# Patient Record
Sex: MALE | Race: WHITE | NOT HISPANIC OR LATINO | URBAN - METROPOLITAN AREA
[De-identification: names, ages, dates, MRNs, and addresses within clinical notes are randomized per-mention and may not be internally consistent; named-entity substitution may affect disease eponyms.]

---

## 2017-01-26 ENCOUNTER — APPOINTMENT (RX ONLY)
Dept: URBAN - METROPOLITAN AREA CLINIC 39 | Facility: CLINIC | Age: 77
Setting detail: DERMATOLOGY
End: 2017-01-26

## 2017-01-26 DIAGNOSIS — L82.1 OTHER SEBORRHEIC KERATOSIS: ICD-10-CM

## 2017-01-26 DIAGNOSIS — D18.0 HEMANGIOMA: ICD-10-CM

## 2017-01-26 DIAGNOSIS — Z85.828 PERSONAL HISTORY OF OTHER MALIGNANT NEOPLASM OF SKIN: ICD-10-CM

## 2017-01-26 DIAGNOSIS — L57.0 ACTINIC KERATOSIS: ICD-10-CM

## 2017-01-26 PROBLEM — D18.01 HEMANGIOMA OF SKIN AND SUBCUTANEOUS TISSUE: Status: ACTIVE | Noted: 2017-01-26

## 2017-01-26 PROBLEM — D48.5 NEOPLASM OF UNCERTAIN BEHAVIOR OF SKIN: Status: ACTIVE | Noted: 2017-01-26

## 2017-01-26 PROCEDURE — 17000 DESTRUCT PREMALG LESION: CPT

## 2017-01-26 PROCEDURE — 99213 OFFICE O/P EST LOW 20 MIN: CPT | Mod: 25

## 2017-01-26 PROCEDURE — ? COUNSELING

## 2017-01-26 PROCEDURE — 17003 DESTRUCT PREMALG LES 2-14: CPT

## 2017-01-26 PROCEDURE — 69100 BIOPSY OF EXTERNAL EAR: CPT | Mod: 59

## 2017-01-26 PROCEDURE — ? BIOPSY BY SHAVE METHOD

## 2017-01-26 PROCEDURE — ? LIQUID NITROGEN

## 2017-01-26 ASSESSMENT — LOCATION SIMPLE DESCRIPTION DERM
LOCATION SIMPLE: RIGHT CHEEK
LOCATION SIMPLE: SCALP
LOCATION SIMPLE: LEFT HAND
LOCATION SIMPLE: RIGHT HAND
LOCATION SIMPLE: UPPER BACK
LOCATION SIMPLE: LEFT EAR
LOCATION SIMPLE: ABDOMEN

## 2017-01-26 ASSESSMENT — LOCATION DETAILED DESCRIPTION DERM
LOCATION DETAILED: RIGHT SUPERIOR LATERAL MALAR CHEEK
LOCATION DETAILED: LEFT ANTITRAGUS
LOCATION DETAILED: XIPHOID
LOCATION DETAILED: RIGHT INFERIOR CENTRAL MALAR CHEEK
LOCATION DETAILED: LEFT ULNAR DORSAL HAND
LOCATION DETAILED: INFERIOR THORACIC SPINE
LOCATION DETAILED: LEFT SUPERIOR PARIETAL SCALP
LOCATION DETAILED: RIGHT RADIAL DORSAL HAND

## 2017-01-26 ASSESSMENT — LOCATION ZONE DERM
LOCATION ZONE: TRUNK
LOCATION ZONE: FACE
LOCATION ZONE: EAR
LOCATION ZONE: HAND
LOCATION ZONE: SCALP

## 2017-01-26 NOTE — PROCEDURE: BIOPSY BY SHAVE METHOD
Body Location Override (Optional - Billing Will Still Be Based On Selected Body Map Location If Applicable): right ear

## 2017-01-26 NOTE — PROCEDURE: MIPS QUALITY
Quality 130: Documentation Of Current Medications In The Medical Record: Current Medications Documented
Quality 226: Preventive Care And Screening: Tobacco Use: Screening And Cessation Intervention: Patient screened for tobacco and never smoked
Detail Level: Detailed
Quality 111:Pneumonia Vaccination Status For Older Adults: Pneumococcal Vaccination Previously Received
Quality 110: Preventive Care And Screening: Influenza Immunization: Influenza Immunization Administered during Influenza season

## 2017-01-26 NOTE — PROCEDURE: LIQUID NITROGEN
Render Post-Care Instructions In Note?: no
Post-Care Instructions: I reviewed with the patient in detail post-care instructions. Patient is to wear sunprotection, and avoid picking at any of the treated lesions. Pt may apply Vaseline to crusted or scabbing areas.
Detail Level: Simple
Duration Of Freeze Thaw-Cycle (Seconds): 0
Total Number Of Aks Treated: 13
Consent: The patient's consent was obtained including but not limited to risks of crusting, scabbing, blistering, scarring, darker or lighter pigmentary change, recurrence, incomplete removal and infection.

## 2017-07-27 ENCOUNTER — APPOINTMENT (RX ONLY)
Dept: URBAN - METROPOLITAN AREA CLINIC 39 | Facility: CLINIC | Age: 77
Setting detail: DERMATOLOGY
End: 2017-07-27

## 2017-07-27 DIAGNOSIS — L82.1 OTHER SEBORRHEIC KERATOSIS: ICD-10-CM

## 2017-07-27 DIAGNOSIS — D18.0 HEMANGIOMA: ICD-10-CM

## 2017-07-27 DIAGNOSIS — Z85.828 PERSONAL HISTORY OF OTHER MALIGNANT NEOPLASM OF SKIN: ICD-10-CM

## 2017-07-27 DIAGNOSIS — L57.0 ACTINIC KERATOSIS: ICD-10-CM

## 2017-07-27 PROBLEM — D18.01 HEMANGIOMA OF SKIN AND SUBCUTANEOUS TISSUE: Status: ACTIVE | Noted: 2017-07-27

## 2017-07-27 PROCEDURE — 17003 DESTRUCT PREMALG LES 2-14: CPT

## 2017-07-27 PROCEDURE — ? COUNSELING

## 2017-07-27 PROCEDURE — 99213 OFFICE O/P EST LOW 20 MIN: CPT | Mod: 25

## 2017-07-27 PROCEDURE — ? LIQUID NITROGEN

## 2017-07-27 PROCEDURE — 17000 DESTRUCT PREMALG LESION: CPT

## 2017-07-27 ASSESSMENT — LOCATION SIMPLE DESCRIPTION DERM
LOCATION SIMPLE: LEFT CHEEK
LOCATION SIMPLE: UPPER BACK
LOCATION SIMPLE: RIGHT EAR
LOCATION SIMPLE: LEFT EAR
LOCATION SIMPLE: RIGHT CHEEK
LOCATION SIMPLE: RIGHT FOREHEAD
LOCATION SIMPLE: ABDOMEN

## 2017-07-27 ASSESSMENT — LOCATION DETAILED DESCRIPTION DERM
LOCATION DETAILED: LEFT ANTITRAGUS
LOCATION DETAILED: XIPHOID
LOCATION DETAILED: INFERIOR THORACIC SPINE
LOCATION DETAILED: LEFT SUPERIOR LATERAL MALAR CHEEK
LOCATION DETAILED: RIGHT SUPERIOR HELIX
LOCATION DETAILED: LEFT ANTIHELIX
LOCATION DETAILED: RIGHT INFERIOR CENTRAL MALAR CHEEK
LOCATION DETAILED: RIGHT CYMBA CONCHA
LOCATION DETAILED: RIGHT FOREHEAD
LOCATION DETAILED: RIGHT INFERIOR FOREHEAD

## 2017-07-27 ASSESSMENT — LOCATION ZONE DERM
LOCATION ZONE: FACE
LOCATION ZONE: EAR
LOCATION ZONE: TRUNK

## 2017-07-27 NOTE — PROCEDURE: MIPS QUALITY
Detail Level: Detailed
Quality 226: Preventive Care And Screening: Tobacco Use: Screening And Cessation Intervention: Patient screened for tobacco and never smoked
Quality 130: Documentation Of Current Medications In The Medical Record: Current Medications Documented
Quality 111:Pneumonia Vaccination Status For Older Adults: Pneumococcal Vaccination Previously Received
Quality 110: Preventive Care And Screening: Influenza Immunization: Influenza Immunization Administered during Influenza season

## 2017-07-27 NOTE — PROCEDURE: LIQUID NITROGEN
Post-Care Instructions: I reviewed with the patient in detail post-care instructions. Patient is to wear sunprotection, and avoid picking at any of the treated lesions. Pt may apply Vaseline to crusted or scabbing areas.
Render Post-Care Instructions In Note?: no
Consent: The patient's consent was obtained including but not limited to risks of crusting, scabbing, blistering, scarring, darker or lighter pigmentary change, recurrence, incomplete removal and infection.
Detail Level: Simple
Duration Of Freeze Thaw-Cycle (Seconds): 0
Total Number Of Aks Treated: 7

## 2018-08-02 ENCOUNTER — APPOINTMENT (RX ONLY)
Dept: URBAN - METROPOLITAN AREA CLINIC 39 | Facility: CLINIC | Age: 78
Setting detail: DERMATOLOGY
End: 2018-08-02

## 2018-08-02 DIAGNOSIS — L82.1 OTHER SEBORRHEIC KERATOSIS: ICD-10-CM

## 2018-08-02 DIAGNOSIS — Z85.828 PERSONAL HISTORY OF OTHER MALIGNANT NEOPLASM OF SKIN: ICD-10-CM

## 2018-08-02 DIAGNOSIS — L57.0 ACTINIC KERATOSIS: ICD-10-CM

## 2018-08-02 DIAGNOSIS — D18.0 HEMANGIOMA: ICD-10-CM

## 2018-08-02 PROBLEM — I48.91 UNSPECIFIED ATRIAL FIBRILLATION: Status: ACTIVE | Noted: 2018-08-02

## 2018-08-02 PROBLEM — D18.01 HEMANGIOMA OF SKIN AND SUBCUTANEOUS TISSUE: Status: ACTIVE | Noted: 2018-08-02

## 2018-08-02 PROCEDURE — 99213 OFFICE O/P EST LOW 20 MIN: CPT | Mod: 25

## 2018-08-02 PROCEDURE — 17000 DESTRUCT PREMALG LESION: CPT

## 2018-08-02 PROCEDURE — ? LIQUID NITROGEN

## 2018-08-02 PROCEDURE — 17003 DESTRUCT PREMALG LES 2-14: CPT

## 2018-08-02 PROCEDURE — ? COUNSELING

## 2018-08-02 ASSESSMENT — LOCATION ZONE DERM
LOCATION ZONE: EAR
LOCATION ZONE: FACE
LOCATION ZONE: TRUNK
LOCATION ZONE: SCALP
LOCATION ZONE: ARM

## 2018-08-02 ASSESSMENT — LOCATION SIMPLE DESCRIPTION DERM
LOCATION SIMPLE: RIGHT EAR
LOCATION SIMPLE: UPPER BACK
LOCATION SIMPLE: SCALP
LOCATION SIMPLE: ABDOMEN
LOCATION SIMPLE: LEFT FOREARM
LOCATION SIMPLE: LEFT CHEEK
LOCATION SIMPLE: LEFT EAR
LOCATION SIMPLE: RIGHT CHEEK

## 2018-08-02 ASSESSMENT — LOCATION DETAILED DESCRIPTION DERM
LOCATION DETAILED: INFERIOR THORACIC SPINE
LOCATION DETAILED: LEFT SUPERIOR LATERAL MALAR CHEEK
LOCATION DETAILED: RIGHT CYMBA CONCHA
LOCATION DETAILED: RIGHT CENTRAL MALAR CHEEK
LOCATION DETAILED: LEFT SUPERIOR PARIETAL SCALP
LOCATION DETAILED: LEFT ANTITRAGUS
LOCATION DETAILED: LEFT DISTAL DORSAL FOREARM
LOCATION DETAILED: XIPHOID
LOCATION DETAILED: RIGHT ANTIHELIX

## 2018-08-02 NOTE — PROCEDURE: MIPS QUALITY
Quality 431: Preventive Care And Screening: Unhealthy Alcohol Use - Screening: Patient screened for unhealthy alcohol use using a single question and scores less than 2 times per year
Quality 130: Documentation Of Current Medications In The Medical Record: Current Medications Documented
Quality 226: Preventive Care And Screening: Tobacco Use: Screening And Cessation Intervention: Patient screened for tobacco and is an ex-smoker
Quality 47: Advance Care Plan: Advance Care Planning discussed and documented; advance care plan or surrogate decision maker documented in the medical record.
Quality 111:Pneumonia Vaccination Status For Older Adults: Pneumococcal Vaccination Previously Received
Quality 131: Pain Assessment And Follow-Up: Pain assessment using a standardized tool is documented as negative, no follow-up plan required
Quality 110: Preventive Care And Screening: Influenza Immunization: Influenza Immunization Administered during Influenza season
Detail Level: Detailed

## 2018-08-02 NOTE — PROCEDURE: LIQUID NITROGEN
Total Number Of Aks Treated: 6
Render Post-Care Instructions In Note?: no
Post-Care Instructions: I reviewed with the patient in detail post-care instructions. Patient is to wear sunprotection, and avoid picking at any of the treated lesions. Pt may apply Vaseline to crusted or scabbing areas.
Detail Level: Simple
Consent: The patient's consent was obtained including but not limited to risks of crusting, scabbing, blistering, scarring, darker or lighter pigmentary change, recurrence, incomplete removal and infection.
Duration Of Freeze Thaw-Cycle (Seconds): 0

## 2018-08-20 ENCOUNTER — APPOINTMENT (RX ONLY)
Dept: URBAN - METROPOLITAN AREA CLINIC 39 | Facility: CLINIC | Age: 78
Setting detail: DERMATOLOGY
End: 2018-08-20

## 2018-08-20 DIAGNOSIS — L82.1 OTHER SEBORRHEIC KERATOSIS: ICD-10-CM

## 2018-08-20 PROCEDURE — 99213 OFFICE O/P EST LOW 20 MIN: CPT

## 2018-08-20 PROCEDURE — ? COUNSELING

## 2018-08-20 ASSESSMENT — LOCATION DETAILED DESCRIPTION DERM: LOCATION DETAILED: RIGHT INFERIOR UPPER BACK

## 2018-08-20 ASSESSMENT — LOCATION ZONE DERM: LOCATION ZONE: TRUNK

## 2018-08-20 ASSESSMENT — LOCATION SIMPLE DESCRIPTION DERM: LOCATION SIMPLE: RIGHT UPPER BACK

## 2018-08-20 NOTE — PROCEDURE: MIPS QUALITY
Quality 47: Advance Care Plan: Advance Care Planning discussed and documented; advance care plan or surrogate decision maker documented in the medical record.
Detail Level: Detailed
Quality 431: Preventive Care And Screening: Unhealthy Alcohol Use - Screening: Patient screened for unhealthy alcohol use using a single question and scores less than 2 times per year
Quality 131: Pain Assessment And Follow-Up: Pain assessment using a standardized tool is documented as negative, no follow-up plan required
Quality 110: Preventive Care And Screening: Influenza Immunization: Influenza Immunization Administered during Influenza season
Quality 111:Pneumonia Vaccination Status For Older Adults: Pneumococcal Vaccination Previously Received
Quality 130: Documentation Of Current Medications In The Medical Record: Current Medications Documented
Quality 226: Preventive Care And Screening: Tobacco Use: Screening And Cessation Intervention: Patient screened for tobacco and is an ex-smoker

## 2019-10-02 ENCOUNTER — APPOINTMENT (RX ONLY)
Dept: URBAN - METROPOLITAN AREA CLINIC 39 | Facility: CLINIC | Age: 79
Setting detail: DERMATOLOGY
End: 2019-10-02

## 2019-10-02 DIAGNOSIS — L57.0 ACTINIC KERATOSIS: ICD-10-CM

## 2019-10-02 PROCEDURE — ? LIQUID NITROGEN

## 2019-10-02 PROCEDURE — ? COUNSELING

## 2019-10-02 PROCEDURE — 17003 DESTRUCT PREMALG LES 2-14: CPT

## 2019-10-02 PROCEDURE — 17000 DESTRUCT PREMALG LESION: CPT

## 2019-10-02 ASSESSMENT — LOCATION SIMPLE DESCRIPTION DERM
LOCATION SIMPLE: RIGHT EAR
LOCATION SIMPLE: LEFT EAR
LOCATION SIMPLE: RIGHT CHEEK

## 2019-10-02 ASSESSMENT — LOCATION DETAILED DESCRIPTION DERM
LOCATION DETAILED: RIGHT INFERIOR MEDIAL MALAR CHEEK
LOCATION DETAILED: RIGHT ANTIHELIX
LOCATION DETAILED: LEFT SUPERIOR HELIX

## 2019-10-02 ASSESSMENT — LOCATION ZONE DERM
LOCATION ZONE: FACE
LOCATION ZONE: EAR

## 2019-10-02 NOTE — PROCEDURE: LIQUID NITROGEN
Post-Care Instructions: I reviewed with the patient in detail post-care instructions. Patient is to wear sunprotection, and avoid picking at any of the treated lesions. Pt may apply Vaseline to crusted or scabbing areas.
Render Note In Bullet Format When Appropriate: No
Detail Level: Detailed
Duration Of Freeze Thaw-Cycle (Seconds): 3
Consent: The patient's consent was obtained.  Risks associated with liquid nitrogen including but not limited to crusting, scabbing, blistering, scarring, darker or lighter pigmentary change, recurrence, incomplete removal and infection.

## 2020-01-08 ENCOUNTER — APPOINTMENT (RX ONLY)
Dept: URBAN - METROPOLITAN AREA CLINIC 39 | Facility: CLINIC | Age: 80
Setting detail: DERMATOLOGY
End: 2020-01-08

## 2020-01-08 DIAGNOSIS — L57.0 ACTINIC KERATOSIS: ICD-10-CM

## 2020-01-08 DIAGNOSIS — Z85.828 PERSONAL HISTORY OF OTHER MALIGNANT NEOPLASM OF SKIN: ICD-10-CM

## 2020-01-08 PROCEDURE — 17000 DESTRUCT PREMALG LESION: CPT

## 2020-01-08 PROCEDURE — ? LIQUID NITROGEN

## 2020-01-08 PROCEDURE — 99213 OFFICE O/P EST LOW 20 MIN: CPT | Mod: 25

## 2020-01-08 PROCEDURE — ? COUNSELING

## 2020-01-08 PROCEDURE — 17003 DESTRUCT PREMALG LES 2-14: CPT

## 2020-01-08 ASSESSMENT — LOCATION ZONE DERM
LOCATION ZONE: HAND
LOCATION ZONE: FACE
LOCATION ZONE: EAR

## 2020-01-08 ASSESSMENT — LOCATION DETAILED DESCRIPTION DERM
LOCATION DETAILED: LEFT INFERIOR CENTRAL MALAR CHEEK
LOCATION DETAILED: LEFT ANTIHELIX
LOCATION DETAILED: LEFT ANTITRAGUS
LOCATION DETAILED: LEFT ULNAR DORSAL HAND
LOCATION DETAILED: LEFT SUPERIOR LATERAL MALAR CHEEK
LOCATION DETAILED: RIGHT CYMBA CONCHA

## 2020-01-08 ASSESSMENT — LOCATION SIMPLE DESCRIPTION DERM
LOCATION SIMPLE: LEFT CHEEK
LOCATION SIMPLE: RIGHT EAR
LOCATION SIMPLE: LEFT EAR
LOCATION SIMPLE: LEFT HAND

## 2020-01-08 NOTE — PROCEDURE: LIQUID NITROGEN
Render In Bullet Format When Appropriate: No
Duration Of Freeze Thaw-Cycle (Seconds): 0
Total Number Of Aks Treated: 5
Post-Care Instructions: I reviewed with the patient in detail post-care instructions. Patient is to wear sunprotection, and avoid picking at any of the treated lesions. Pt may apply Vaseline to crusted or scabbing areas.
Detail Level: Zone
Consent: The patient's consent was obtained including but not limited to risks of crusting, scabbing, blistering, scarring, darker or lighter pigmentary change, recurrence, incomplete removal and infection.

## 2020-07-08 ENCOUNTER — APPOINTMENT (RX ONLY)
Dept: URBAN - METROPOLITAN AREA CLINIC 39 | Facility: CLINIC | Age: 80
Setting detail: DERMATOLOGY
End: 2020-07-08

## 2020-07-08 DIAGNOSIS — Z85.828 PERSONAL HISTORY OF OTHER MALIGNANT NEOPLASM OF SKIN: ICD-10-CM

## 2020-07-08 DIAGNOSIS — L57.0 ACTINIC KERATOSIS: ICD-10-CM

## 2020-07-08 DIAGNOSIS — L72.0 EPIDERMAL CYST: ICD-10-CM

## 2020-07-08 DIAGNOSIS — L82.1 OTHER SEBORRHEIC KERATOSIS: ICD-10-CM

## 2020-07-08 PROCEDURE — 17003 DESTRUCT PREMALG LES 2-14: CPT

## 2020-07-08 PROCEDURE — ? COUNSELING

## 2020-07-08 PROCEDURE — 99213 OFFICE O/P EST LOW 20 MIN: CPT | Mod: 25

## 2020-07-08 PROCEDURE — 17000 DESTRUCT PREMALG LESION: CPT

## 2020-07-08 PROCEDURE — ? ADDITIONAL NOTES

## 2020-07-08 PROCEDURE — ? LIQUID NITROGEN

## 2020-07-08 ASSESSMENT — LOCATION DETAILED DESCRIPTION DERM
LOCATION DETAILED: LEFT SUPERIOR CENTRAL MALAR CHEEK
LOCATION DETAILED: RIGHT INFERIOR CRUS OF ANTIHELIX
LOCATION DETAILED: LEFT SUPERIOR CRUS OF ANTIHELIX
LOCATION DETAILED: RIGHT SUPERIOR HELIX
LOCATION DETAILED: LEFT INFERIOR CRUS OF ANTIHELIX
LOCATION DETAILED: RIGHT MID-UPPER BACK
LOCATION DETAILED: RIGHT SUPERIOR MEDIAL UPPER BACK
LOCATION DETAILED: MID POSTERIOR NECK

## 2020-07-08 ASSESSMENT — LOCATION SIMPLE DESCRIPTION DERM
LOCATION SIMPLE: LEFT CHEEK
LOCATION SIMPLE: LEFT EAR
LOCATION SIMPLE: POSTERIOR NECK
LOCATION SIMPLE: RIGHT EAR
LOCATION SIMPLE: RIGHT UPPER BACK

## 2020-07-08 ASSESSMENT — LOCATION ZONE DERM
LOCATION ZONE: FACE
LOCATION ZONE: TRUNK
LOCATION ZONE: EAR
LOCATION ZONE: NECK

## 2020-07-08 NOTE — PROCEDURE: MIPS QUALITY
Quality 47: Advance Care Plan: Advance Care Planning discussed and documented in the medical record; patient did not wish or was not able to name a surrogate decision maker or provide an advance care plan.
Quality 226: Preventive Care And Screening: Tobacco Use: Screening And Cessation Intervention: Patient screened for tobacco use and is an ex/non-smoker
Detail Level: Detailed
Quality 111:Pneumonia Vaccination Status For Older Adults: Pneumococcal Vaccination Previously Received
Quality 130: Documentation Of Current Medications In The Medical Record: Current Medications Documented
Quality 402: Tobacco Use And Help With Quitting Among Adolescents: Patient screened for tobacco and is an ex-smoker
Quality 431: Preventive Care And Screening: Unhealthy Alcohol Use - Screening: Patient screened for unhealthy alcohol use using a single question and scores less than 2 times per year
Quality 110: Preventive Care And Screening: Influenza Immunization: Influenza Immunization not Administered because Patient Refused.

## 2020-07-08 NOTE — PROCEDURE: LIQUID NITROGEN
Consent: The patient's consent was obtained including but not limited to risks of crusting, scabbing, blistering, scarring, darker or lighter pigmentary change, recurrence, incomplete removal and infection.
Render Post-Care Instructions In Note?: no
Total Number Of Aks Treated: 5
Post-Care Instructions: I reviewed with the patient in detail post-care instructions. Patient is to wear sunprotection, and avoid picking at any of the treated lesions. Pt may apply Vaseline to crusted or scabbing areas.
Duration Of Freeze Thaw-Cycle (Seconds): 0
Detail Level: Zone

## 2021-01-06 ENCOUNTER — APPOINTMENT (RX ONLY)
Dept: URBAN - METROPOLITAN AREA CLINIC 39 | Facility: CLINIC | Age: 81
Setting detail: DERMATOLOGY
End: 2021-01-06

## 2021-01-06 DIAGNOSIS — L82.1 OTHER SEBORRHEIC KERATOSIS: ICD-10-CM | Status: STABLE

## 2021-01-06 DIAGNOSIS — L81.4 OTHER MELANIN HYPERPIGMENTATION: ICD-10-CM

## 2021-01-06 DIAGNOSIS — Z85.828 PERSONAL HISTORY OF OTHER MALIGNANT NEOPLASM OF SKIN: ICD-10-CM

## 2021-01-06 DIAGNOSIS — L57.0 ACTINIC KERATOSIS: ICD-10-CM

## 2021-01-06 PROCEDURE — ? ADDITIONAL NOTES

## 2021-01-06 PROCEDURE — ? LIQUID NITROGEN

## 2021-01-06 PROCEDURE — ? COUNSELING

## 2021-01-06 PROCEDURE — 17000 DESTRUCT PREMALG LESION: CPT

## 2021-01-06 PROCEDURE — 17003 DESTRUCT PREMALG LES 2-14: CPT

## 2021-01-06 PROCEDURE — 99213 OFFICE O/P EST LOW 20 MIN: CPT | Mod: 25

## 2021-01-06 ASSESSMENT — LOCATION ZONE DERM
LOCATION ZONE: EAR
LOCATION ZONE: TRUNK
LOCATION ZONE: FACE

## 2021-01-06 ASSESSMENT — LOCATION SIMPLE DESCRIPTION DERM
LOCATION SIMPLE: RIGHT CHEEK
LOCATION SIMPLE: LEFT CHEEK
LOCATION SIMPLE: LEFT FOREHEAD
LOCATION SIMPLE: RIGHT EAR
LOCATION SIMPLE: LEFT UPPER BACK
LOCATION SIMPLE: LEFT EAR

## 2021-01-06 ASSESSMENT — LOCATION DETAILED DESCRIPTION DERM
LOCATION DETAILED: RIGHT SUPERIOR HELIX
LOCATION DETAILED: LEFT MID-UPPER BACK
LOCATION DETAILED: RIGHT SUPERIOR LATERAL MALAR CHEEK
LOCATION DETAILED: LEFT SUPERIOR CENTRAL MALAR CHEEK
LOCATION DETAILED: LEFT SUPERIOR LATERAL MALAR CHEEK
LOCATION DETAILED: LEFT MEDIAL FOREHEAD
LOCATION DETAILED: LEFT SUPERIOR CRUS OF ANTIHELIX
LOCATION DETAILED: LEFT CENTRAL MALAR CHEEK

## 2021-01-06 NOTE — PROCEDURE: LIQUID NITROGEN
Render In Bullet Format When Appropriate: No
Post-Care Instructions: I reviewed with the patient in detail post-care instructions. Patient is to wear sunprotection, and avoid picking at any of the treated lesions. Pt may apply Vaseline to crusted or scabbing areas.
Total Number Of Aks Treated: 6
Detail Level: Zone
Duration Of Freeze Thaw-Cycle (Seconds): 0
Consent: The patient's consent was obtained including but not limited to risks of crusting, scabbing, blistering, scarring, darker or lighter pigmentary change, recurrence, incomplete removal and infection.

## 2021-02-08 ENCOUNTER — APPOINTMENT (RX ONLY)
Dept: URBAN - METROPOLITAN AREA CLINIC 39 | Facility: CLINIC | Age: 81
Setting detail: DERMATOLOGY
End: 2021-02-08

## 2021-02-08 DIAGNOSIS — L57.0 ACTINIC KERATOSIS: ICD-10-CM

## 2021-02-08 PROCEDURE — ? LIQUID NITROGEN

## 2021-02-08 PROCEDURE — 17003 DESTRUCT PREMALG LES 2-14: CPT

## 2021-02-08 PROCEDURE — 17000 DESTRUCT PREMALG LESION: CPT

## 2021-02-08 PROCEDURE — ? ADDITIONAL NOTES

## 2021-02-08 ASSESSMENT — LOCATION DETAILED DESCRIPTION DERM
LOCATION DETAILED: LEFT CENTRAL FRONTAL SCALP
LOCATION DETAILED: RIGHT MEDIAL FRONTAL SCALP
LOCATION DETAILED: LEFT SUPERIOR OCCIPITAL SCALP
LOCATION DETAILED: LEFT SUPERIOR POSTERIOR NECK

## 2021-02-08 ASSESSMENT — LOCATION SIMPLE DESCRIPTION DERM
LOCATION SIMPLE: POSTERIOR NECK
LOCATION SIMPLE: RIGHT SCALP
LOCATION SIMPLE: LEFT OCCIPITAL SCALP
LOCATION SIMPLE: LEFT SCALP

## 2021-02-08 ASSESSMENT — LOCATION ZONE DERM
LOCATION ZONE: NECK
LOCATION ZONE: SCALP

## 2021-02-08 NOTE — PROCEDURE: LIQUID NITROGEN
Render In Bullet Format When Appropriate: No
Total Number Of Aks Treated: 4
Detail Level: Zone
Consent: The patient's consent was obtained including but not limited to risks of crusting, scabbing, blistering, scarring, darker or lighter pigmentary change, recurrence, incomplete removal and infection.
Duration Of Freeze Thaw-Cycle (Seconds): 0
Post-Care Instructions: I reviewed with the patient in detail post-care instructions. Patient is to wear sunprotection, and avoid picking at any of the treated lesions. Pt may apply Vaseline to crusted or scabbing areas.

## 2021-06-02 ENCOUNTER — APPOINTMENT (RX ONLY)
Dept: URBAN - METROPOLITAN AREA CLINIC 39 | Facility: CLINIC | Age: 81
Setting detail: DERMATOLOGY
End: 2021-06-02

## 2021-06-02 DIAGNOSIS — L82.0 INFLAMED SEBORRHEIC KERATOSIS: ICD-10-CM

## 2021-06-02 DIAGNOSIS — L57.0 ACTINIC KERATOSIS: ICD-10-CM

## 2021-06-02 DIAGNOSIS — Z85.828 PERSONAL HISTORY OF OTHER MALIGNANT NEOPLASM OF SKIN: ICD-10-CM

## 2021-06-02 DIAGNOSIS — L82.1 OTHER SEBORRHEIC KERATOSIS: ICD-10-CM

## 2021-06-02 DIAGNOSIS — L81.4 OTHER MELANIN HYPERPIGMENTATION: ICD-10-CM

## 2021-06-02 PROCEDURE — 17110 DESTRUCTION B9 LES UP TO 14: CPT

## 2021-06-02 PROCEDURE — ? LIQUID NITROGEN

## 2021-06-02 PROCEDURE — ? BENIGN DESTRUCTION

## 2021-06-02 PROCEDURE — ? FULL BODY SKIN EXAM

## 2021-06-02 PROCEDURE — ? ADDITIONAL NOTES

## 2021-06-02 PROCEDURE — 99213 OFFICE O/P EST LOW 20 MIN: CPT | Mod: 25

## 2021-06-02 PROCEDURE — ? COUNSELING

## 2021-06-02 PROCEDURE — 17003 DESTRUCT PREMALG LES 2-14: CPT | Mod: 59

## 2021-06-02 PROCEDURE — 17000 DESTRUCT PREMALG LESION: CPT | Mod: 59

## 2021-06-02 PROCEDURE — ? SUNSCREEN RECOMMENDATIONS

## 2021-06-02 ASSESSMENT — LOCATION DETAILED DESCRIPTION DERM
LOCATION DETAILED: LEFT SUPERIOR CRUS OF ANTIHELIX
LOCATION DETAILED: LEFT MEDIAL MALAR CHEEK
LOCATION DETAILED: INFERIOR THORACIC SPINE
LOCATION DETAILED: RIGHT INFERIOR LATERAL FOREHEAD
LOCATION DETAILED: MEDIAL FRONTAL SCALP
LOCATION DETAILED: RIGHT ANTIHELIX
LOCATION DETAILED: RIGHT SUPERIOR MEDIAL UPPER BACK
LOCATION DETAILED: LEFT MID TEMPLE
LOCATION DETAILED: RIGHT SUPERIOR HELIX
LOCATION DETAILED: LEFT SUPERIOR CENTRAL MALAR CHEEK
LOCATION DETAILED: LEFT LATERAL FOREHEAD
LOCATION DETAILED: RIGHT INFERIOR TEMPLE

## 2021-06-02 ASSESSMENT — LOCATION SIMPLE DESCRIPTION DERM
LOCATION SIMPLE: LEFT EAR
LOCATION SIMPLE: LEFT CHEEK
LOCATION SIMPLE: RIGHT EAR
LOCATION SIMPLE: FRONTAL SCALP
LOCATION SIMPLE: UPPER BACK
LOCATION SIMPLE: RIGHT TEMPLE
LOCATION SIMPLE: LEFT TEMPLE
LOCATION SIMPLE: RIGHT UPPER BACK
LOCATION SIMPLE: LEFT FOREHEAD
LOCATION SIMPLE: RIGHT FOREHEAD

## 2021-06-02 ASSESSMENT — LOCATION ZONE DERM
LOCATION ZONE: FACE
LOCATION ZONE: SCALP
LOCATION ZONE: TRUNK
LOCATION ZONE: EAR

## 2021-06-02 NOTE — PROCEDURE: LIQUID NITROGEN
Detail Level: Zone
Total Number Of Aks Treated: 11
Duration Of Freeze Thaw-Cycle (Seconds): 0
Render In Bullet Format When Appropriate: No
Consent: The patient's consent was obtained including but not limited to risks of crusting, scabbing, blistering, scarring, darker or lighter pigmentary change, recurrence, incomplete removal and infection.
Post-Care Instructions: I reviewed with the patient in detail post-care instructions. Patient is to wear sunprotection, and avoid picking at any of the treated lesions. Pt may apply Vaseline to crusted or scabbing areas.

## 2021-06-02 NOTE — PROCEDURE: SUNSCREEN RECOMMENDATIONS

## 2021-06-02 NOTE — PROCEDURE: BENIGN DESTRUCTION
Treatment Number (Will Not Render If 0): 1
Include Z78.9 (Other Specified Conditions Influencing Health Status) As An Associated Diagnosis?: No
Medical Necessity Clause: This procedure was medically necessary because the lesions that were treated were:
Detail Level: Detailed
Post-Care Instructions: I reviewed with the patient in detail post-care instructions. Patient is to wear sunprotection, and avoid picking at any of the treated lesions. Pt may apply Vaseline to crusted or scabbing areas.
Anesthesia Volume In Cc: 0.5
Consent: The patient's consent was obtained including but not limited to risks of crusting, scabbing, blistering, scarring, darker or lighter pigmentary change, recurrence, incomplete removal and infection.
Medical Necessity Information: It is in your best interest to select a reason for this procedure from the list below. All of these items fulfill various CMS LCD requirements except the new and changing color options.

## 2021-10-01 ENCOUNTER — APPOINTMENT (RX ONLY)
Dept: URBAN - METROPOLITAN AREA CLINIC 39 | Facility: CLINIC | Age: 81
Setting detail: DERMATOLOGY
End: 2021-10-01

## 2021-10-01 DIAGNOSIS — Z85.828 PERSONAL HISTORY OF OTHER MALIGNANT NEOPLASM OF SKIN: ICD-10-CM

## 2021-10-01 DIAGNOSIS — L57.0 ACTINIC KERATOSIS: ICD-10-CM

## 2021-10-01 PROCEDURE — ? COUNSELING

## 2021-10-01 PROCEDURE — ? LIQUID NITROGEN

## 2021-10-01 PROCEDURE — 17003 DESTRUCT PREMALG LES 2-14: CPT

## 2021-10-01 PROCEDURE — ? ADDITIONAL NOTES

## 2021-10-01 PROCEDURE — 17000 DESTRUCT PREMALG LESION: CPT

## 2021-10-01 ASSESSMENT — LOCATION SIMPLE DESCRIPTION DERM
LOCATION SIMPLE: POSTERIOR NECK
LOCATION SIMPLE: LEFT SCALP
LOCATION SIMPLE: LEFT CHEEK
LOCATION SIMPLE: RIGHT TEMPLE
LOCATION SIMPLE: RIGHT SCALP
LOCATION SIMPLE: LEFT OCCIPITAL SCALP
LOCATION SIMPLE: RIGHT EAR
LOCATION SIMPLE: LEFT FOREHEAD
LOCATION SIMPLE: LEFT EAR
LOCATION SIMPLE: RIGHT CHEEK

## 2021-10-01 ASSESSMENT — LOCATION DETAILED DESCRIPTION DERM
LOCATION DETAILED: LEFT SUPERIOR FOREHEAD
LOCATION DETAILED: LEFT CENTRAL FRONTAL SCALP
LOCATION DETAILED: LEFT SUPERIOR OCCIPITAL SCALP
LOCATION DETAILED: RIGHT MID TEMPLE
LOCATION DETAILED: RIGHT SUPERIOR HELIX
LOCATION DETAILED: LEFT SUPERIOR CENTRAL MALAR CHEEK
LOCATION DETAILED: RIGHT MEDIAL FRONTAL SCALP
LOCATION DETAILED: LEFT SUPERIOR CRUS OF ANTIHELIX
LOCATION DETAILED: RIGHT INFERIOR CENTRAL MALAR CHEEK
LOCATION DETAILED: RIGHT SUPERIOR LATERAL MALAR CHEEK
LOCATION DETAILED: LEFT SUPERIOR POSTERIOR NECK

## 2021-10-01 ASSESSMENT — LOCATION ZONE DERM
LOCATION ZONE: EAR
LOCATION ZONE: SCALP
LOCATION ZONE: FACE
LOCATION ZONE: NECK

## 2021-10-01 NOTE — PROCEDURE: LIQUID NITROGEN
Render In Bullet Format When Appropriate: No
Total Number Of Aks Treated: 4
Detail Level: Zone
Consent: The patient's consent was obtained including but not limited to risks of crusting, scabbing, blistering, scarring, darker or lighter pigmentary change, recurrence, incomplete removal and infection.
Duration Of Freeze Thaw-Cycle (Seconds): 0
Post-Care Instructions: I reviewed with the patient in detail post-care instructions. Patient is to wear sunprotection, and avoid picking at any of the treated lesions. Pt may apply Vaseline to crusted or scabbing areas.
Total Number Of Aks Treated: 6

## 2022-03-31 ENCOUNTER — APPOINTMENT (RX ONLY)
Dept: URBAN - METROPOLITAN AREA CLINIC 39 | Facility: CLINIC | Age: 82
Setting detail: DERMATOLOGY
End: 2022-03-31

## 2022-03-31 DIAGNOSIS — L81.4 OTHER MELANIN HYPERPIGMENTATION: ICD-10-CM

## 2022-03-31 DIAGNOSIS — L82.1 OTHER SEBORRHEIC KERATOSIS: ICD-10-CM

## 2022-03-31 DIAGNOSIS — L57.0 ACTINIC KERATOSIS: ICD-10-CM

## 2022-03-31 DIAGNOSIS — Z85.828 PERSONAL HISTORY OF OTHER MALIGNANT NEOPLASM OF SKIN: ICD-10-CM

## 2022-03-31 PROCEDURE — ? SUNSCREEN RECOMMENDATIONS

## 2022-03-31 PROCEDURE — ? COUNSELING

## 2022-03-31 PROCEDURE — 99213 OFFICE O/P EST LOW 20 MIN: CPT | Mod: 25

## 2022-03-31 PROCEDURE — 17000 DESTRUCT PREMALG LESION: CPT

## 2022-03-31 PROCEDURE — 17003 DESTRUCT PREMALG LES 2-14: CPT

## 2022-03-31 PROCEDURE — ? LIQUID NITROGEN

## 2022-03-31 PROCEDURE — ? ADDITIONAL NOTES

## 2022-03-31 ASSESSMENT — LOCATION ZONE DERM
LOCATION ZONE: TRUNK
LOCATION ZONE: FACE
LOCATION ZONE: EAR
LOCATION ZONE: SCALP

## 2022-03-31 ASSESSMENT — LOCATION SIMPLE DESCRIPTION DERM
LOCATION SIMPLE: LEFT EAR
LOCATION SIMPLE: RIGHT TEMPLE
LOCATION SIMPLE: UPPER BACK
LOCATION SIMPLE: POSTERIOR SCALP
LOCATION SIMPLE: LEFT CHEEK
LOCATION SIMPLE: RIGHT UPPER BACK
LOCATION SIMPLE: RIGHT EAR

## 2022-03-31 ASSESSMENT — LOCATION DETAILED DESCRIPTION DERM
LOCATION DETAILED: RIGHT SUPERIOR HELIX
LOCATION DETAILED: RIGHT SUPERIOR MEDIAL UPPER BACK
LOCATION DETAILED: RIGHT MEDIAL TEMPLE
LOCATION DETAILED: INFERIOR THORACIC SPINE
LOCATION DETAILED: POSTERIOR MID-PARIETAL SCALP
LOCATION DETAILED: LEFT SUPERIOR CRUS OF ANTIHELIX
LOCATION DETAILED: LEFT SUPERIOR CENTRAL MALAR CHEEK

## 2022-03-31 NOTE — PROCEDURE: LIQUID NITROGEN
Detail Level: Zone
Total Number Of Aks Treated: 3
Duration Of Freeze Thaw-Cycle (Seconds): 0
Render In Bullet Format When Appropriate: No
Post-Care Instructions: I reviewed with the patient in detail post-care instructions. Patient is to wear sunprotection, and avoid picking at any of the treated lesions. Pt may apply Vaseline to crusted or scabbing areas.
Consent: The patient's consent was obtained including but not limited to risks of crusting, scabbing, blistering, scarring, darker or lighter pigmentary change, recurrence, incomplete removal and infection.

## 2023-01-10 ENCOUNTER — APPOINTMENT (RX ONLY)
Dept: URBAN - METROPOLITAN AREA CLINIC 39 | Facility: CLINIC | Age: 83
Setting detail: DERMATOLOGY
End: 2023-01-10

## 2023-01-10 DIAGNOSIS — L57.0 ACTINIC KERATOSIS: ICD-10-CM

## 2023-01-10 DIAGNOSIS — Z85.828 PERSONAL HISTORY OF OTHER MALIGNANT NEOPLASM OF SKIN: ICD-10-CM

## 2023-01-10 DIAGNOSIS — L82.1 OTHER SEBORRHEIC KERATOSIS: ICD-10-CM

## 2023-01-10 DIAGNOSIS — L81.4 OTHER MELANIN HYPERPIGMENTATION: ICD-10-CM

## 2023-01-10 PROCEDURE — ? LIQUID NITROGEN

## 2023-01-10 PROCEDURE — ? COUNSELING

## 2023-01-10 PROCEDURE — ? FULL BODY SKIN EXAM

## 2023-01-10 PROCEDURE — 99213 OFFICE O/P EST LOW 20 MIN: CPT | Mod: 25

## 2023-01-10 PROCEDURE — ? ADDITIONAL NOTES

## 2023-01-10 PROCEDURE — 17004 DESTROY PREMAL LESIONS 15/>: CPT

## 2023-01-10 PROCEDURE — ? SUNSCREEN RECOMMENDATIONS

## 2023-01-10 ASSESSMENT — LOCATION DETAILED DESCRIPTION DERM
LOCATION DETAILED: MID TRAPEZIAL NECK
LOCATION DETAILED: LEFT SUPERIOR CENTRAL MALAR CHEEK
LOCATION DETAILED: RIGHT CENTRAL MALAR CHEEK
LOCATION DETAILED: LEFT ANTERIOR SHOULDER
LOCATION DETAILED: LEFT SCAPHA
LOCATION DETAILED: LEFT CENTRAL MALAR CHEEK
LOCATION DETAILED: INFERIOR THORACIC SPINE
LOCATION DETAILED: RIGHT ANTIHELIX
LOCATION DETAILED: RIGHT SUPERIOR HELIX
LOCATION DETAILED: LEFT SUPERIOR CRUS OF ANTIHELIX
LOCATION DETAILED: LEFT FOREHEAD
LOCATION DETAILED: RIGHT SUPERIOR MEDIAL UPPER BACK
LOCATION DETAILED: LEFT INFERIOR LATERAL MALAR CHEEK
LOCATION DETAILED: RIGHT LATERAL SUPERIOR CHEST
LOCATION DETAILED: RIGHT INFERIOR LATERAL MALAR CHEEK
LOCATION DETAILED: RIGHT FOREHEAD

## 2023-01-10 ASSESSMENT — LOCATION ZONE DERM
LOCATION ZONE: EAR
LOCATION ZONE: TRUNK
LOCATION ZONE: NECK
LOCATION ZONE: ARM
LOCATION ZONE: FACE

## 2023-01-10 ASSESSMENT — LOCATION SIMPLE DESCRIPTION DERM
LOCATION SIMPLE: LEFT CHEEK
LOCATION SIMPLE: CHEST
LOCATION SIMPLE: RIGHT FOREHEAD
LOCATION SIMPLE: TRAPEZIAL NECK
LOCATION SIMPLE: RIGHT EAR
LOCATION SIMPLE: RIGHT UPPER BACK
LOCATION SIMPLE: LEFT SHOULDER
LOCATION SIMPLE: LEFT EAR
LOCATION SIMPLE: RIGHT CHEEK
LOCATION SIMPLE: LEFT FOREHEAD
LOCATION SIMPLE: UPPER BACK

## 2023-01-10 NOTE — PROCEDURE: LIQUID NITROGEN
Post-Care Instructions: I reviewed with the patient in detail post-care instructions. Patient is to wear sunprotection, and avoid picking at any of the treated lesions. Pt may apply Vaseline to crusted or scabbing areas.
Duration Of Freeze Thaw-Cycle (Seconds): 0
Render In Bullet Format When Appropriate: No
Total Number Of Aks Treated: 16
Consent: The patient's consent was obtained including but not limited to risks of crusting, scabbing, blistering, scarring, darker or lighter pigmentary change, recurrence, incomplete removal and infection.
Detail Level: Zone

## 2023-02-14 ENCOUNTER — APPOINTMENT (RX ONLY)
Dept: URBAN - METROPOLITAN AREA CLINIC 39 | Facility: CLINIC | Age: 83
Setting detail: DERMATOLOGY
End: 2023-02-14

## 2023-02-14 DIAGNOSIS — L57.8 OTHER SKIN CHANGES DUE TO CHRONIC EXPOSURE TO NONIONIZING RADIATION: ICD-10-CM

## 2023-02-14 PROCEDURE — ? PRESCRIPTION

## 2023-02-14 PROCEDURE — ? COUNSELING

## 2023-02-14 PROCEDURE — ? ADDITIONAL NOTES

## 2023-02-14 PROCEDURE — 99213 OFFICE O/P EST LOW 20 MIN: CPT

## 2023-02-14 RX ORDER — FLUOROURACIL 5 MG/G
CREAM TOPICAL
Qty: 40 | Refills: 0 | Status: ERX | COMMUNITY
Start: 2023-02-14

## 2023-02-14 RX ADMIN — FLUOROURACIL: 5 CREAM TOPICAL at 00:00

## 2023-02-14 ASSESSMENT — LOCATION ZONE DERM: LOCATION ZONE: FACE

## 2023-02-14 ASSESSMENT — LOCATION SIMPLE DESCRIPTION DERM: LOCATION SIMPLE: LEFT CHEEK

## 2023-02-14 ASSESSMENT — LOCATION DETAILED DESCRIPTION DERM: LOCATION DETAILED: LEFT INFERIOR CENTRAL MALAR CHEEK

## 2023-03-14 ENCOUNTER — APPOINTMENT (RX ONLY)
Dept: URBAN - METROPOLITAN AREA CLINIC 39 | Facility: CLINIC | Age: 83
Setting detail: DERMATOLOGY
End: 2023-03-14

## 2023-03-14 DIAGNOSIS — L57.8 OTHER SKIN CHANGES DUE TO CHRONIC EXPOSURE TO NONIONIZING RADIATION: ICD-10-CM | Status: RESOLVING

## 2023-03-14 PROCEDURE — ? FULL BODY SKIN EXAM - DECLINED

## 2023-03-14 PROCEDURE — ? ADDITIONAL NOTES

## 2023-03-14 PROCEDURE — ? COUNSELING

## 2023-03-14 PROCEDURE — 99213 OFFICE O/P EST LOW 20 MIN: CPT

## 2023-03-14 PROCEDURE — ? PRESCRIPTION MEDICATION MANAGEMENT

## 2023-03-14 ASSESSMENT — LOCATION DETAILED DESCRIPTION DERM: LOCATION DETAILED: LEFT INFERIOR CENTRAL MALAR CHEEK

## 2023-03-14 ASSESSMENT — LOCATION ZONE DERM: LOCATION ZONE: FACE

## 2023-03-14 ASSESSMENT — LOCATION SIMPLE DESCRIPTION DERM: LOCATION SIMPLE: LEFT CHEEK

## 2023-07-18 ENCOUNTER — APPOINTMENT (RX ONLY)
Dept: URBAN - METROPOLITAN AREA CLINIC 39 | Facility: CLINIC | Age: 83
Setting detail: DERMATOLOGY
End: 2023-07-18

## 2023-07-18 DIAGNOSIS — L57.8 OTHER SKIN CHANGES DUE TO CHRONIC EXPOSURE TO NONIONIZING RADIATION: ICD-10-CM

## 2023-07-18 PROCEDURE — ? PRESCRIPTION MEDICATION MANAGEMENT

## 2023-07-18 PROCEDURE — ? COUNSELING

## 2023-07-18 PROCEDURE — 99213 OFFICE O/P EST LOW 20 MIN: CPT

## 2023-07-18 ASSESSMENT — LOCATION DETAILED DESCRIPTION DERM: LOCATION DETAILED: LEFT INFERIOR CENTRAL MALAR CHEEK

## 2023-07-18 ASSESSMENT — LOCATION SIMPLE DESCRIPTION DERM: LOCATION SIMPLE: LEFT CHEEK

## 2023-07-18 ASSESSMENT — LOCATION ZONE DERM: LOCATION ZONE: FACE

## 2024-01-18 ENCOUNTER — APPOINTMENT (RX ONLY)
Dept: URBAN - METROPOLITAN AREA CLINIC 39 | Facility: CLINIC | Age: 84
Setting detail: DERMATOLOGY
End: 2024-01-18

## 2024-01-18 DIAGNOSIS — Z85.828 PERSONAL HISTORY OF OTHER MALIGNANT NEOPLASM OF SKIN: ICD-10-CM

## 2024-01-18 DIAGNOSIS — L57.0 ACTINIC KERATOSIS: ICD-10-CM

## 2024-01-18 DIAGNOSIS — L81.4 OTHER MELANIN HYPERPIGMENTATION: ICD-10-CM

## 2024-01-18 DIAGNOSIS — L82.1 OTHER SEBORRHEIC KERATOSIS: ICD-10-CM

## 2024-01-18 PROCEDURE — 17000 DESTRUCT PREMALG LESION: CPT

## 2024-01-18 PROCEDURE — 99213 OFFICE O/P EST LOW 20 MIN: CPT | Mod: 25

## 2024-01-18 PROCEDURE — 17003 DESTRUCT PREMALG LES 2-14: CPT

## 2024-01-18 PROCEDURE — ? LIQUID NITROGEN

## 2024-01-18 PROCEDURE — ? FULL BODY SKIN EXAM

## 2024-01-18 PROCEDURE — ? DIAGNOSIS COMMENT

## 2024-01-18 PROCEDURE — ? SUNSCREEN RECOMMENDATIONS

## 2024-01-18 PROCEDURE — ? COUNSELING

## 2024-01-18 ASSESSMENT — LOCATION DETAILED DESCRIPTION DERM
LOCATION DETAILED: LEFT SUPERIOR CENTRAL MALAR CHEEK
LOCATION DETAILED: LEFT SUPERIOR CRUS OF ANTIHELIX
LOCATION DETAILED: LEFT PROXIMAL DORSAL FOREARM
LOCATION DETAILED: INFERIOR THORACIC SPINE
LOCATION DETAILED: RIGHT SUPERIOR PARIETAL SCALP
LOCATION DETAILED: RIGHT SUPERIOR HELIX
LOCATION DETAILED: RIGHT SUPERIOR MEDIAL UPPER BACK
LOCATION DETAILED: RIGHT DISTAL DORSAL FOREARM

## 2024-01-18 ASSESSMENT — LOCATION SIMPLE DESCRIPTION DERM
LOCATION SIMPLE: RIGHT EAR
LOCATION SIMPLE: LEFT CHEEK
LOCATION SIMPLE: LEFT FOREARM
LOCATION SIMPLE: RIGHT FOREARM
LOCATION SIMPLE: SCALP
LOCATION SIMPLE: RIGHT UPPER BACK
LOCATION SIMPLE: UPPER BACK
LOCATION SIMPLE: LEFT EAR

## 2024-01-18 ASSESSMENT — LOCATION ZONE DERM
LOCATION ZONE: SCALP
LOCATION ZONE: FACE
LOCATION ZONE: ARM
LOCATION ZONE: TRUNK
LOCATION ZONE: EAR

## 2024-01-18 ASSESSMENT — TOTAL NUMBER OF LESIONS: # OF LESIONS?: 3

## 2024-01-18 NOTE — PROCEDURE: MIPS QUALITY
Quality 47: Advance Care Plan: Advance Care Planning discussed and documented in the medical record; patient did not wish or was not able to name a surrogate decision maker or provide an advance care plan.
Quality 226: Preventive Care And Screening: Tobacco Use: Screening And Cessation Intervention: Patient screened for tobacco use and is an ex/non-smoker
Detail Level: Detailed
Quality 130: Documentation Of Current Medications In The Medical Record: Current Medications Documented
Quality 402: Tobacco Use And Help With Quitting Among Adolescents: Patient screened for tobacco and is an ex-smoker
Quality 110: Preventive Care And Screening: Influenza Immunization: Influenza Immunization not Administered because Patient Refused.
Quality 431: Preventive Care And Screening: Unhealthy Alcohol Use - Screening: Patient identified as an unhealthy alcohol user when screened for unhealthy alcohol use using a systematic screening method and received brief counseling

## 2024-01-18 NOTE — PROCEDURE: LIQUID NITROGEN
Post-Care Instructions: I reviewed with the patient in detail post-care instructions. Patient is to wear sunprotection, and avoid picking at any of the treated lesions. Pt may apply Vaseline to crusted or scabbing areas.
Total Number Of Aks Treated: 3
Render In Bullet Format When Appropriate: No
Consent: The patient's consent was obtained including but not limited to risks of crusting, scabbing, blistering, scarring, darker or lighter pigmentary change, recurrence, incomplete removal and infection.
Duration Of Freeze Thaw-Cycle (Seconds): 0
Detail Level: Zone

## 2024-07-18 ENCOUNTER — APPOINTMENT (RX ONLY)
Dept: URBAN - METROPOLITAN AREA CLINIC 39 | Facility: CLINIC | Age: 84
Setting detail: DERMATOLOGY
End: 2024-07-18

## 2024-07-18 DIAGNOSIS — L57.0 ACTINIC KERATOSIS: ICD-10-CM

## 2024-07-18 DIAGNOSIS — Z85.828 PERSONAL HISTORY OF OTHER MALIGNANT NEOPLASM OF SKIN: ICD-10-CM

## 2024-07-18 DIAGNOSIS — L82.0 INFLAMED SEBORRHEIC KERATOSIS: ICD-10-CM

## 2024-07-18 DIAGNOSIS — D22 MELANOCYTIC NEVI: ICD-10-CM

## 2024-07-18 DIAGNOSIS — L81.4 OTHER MELANIN HYPERPIGMENTATION: ICD-10-CM

## 2024-07-18 PROBLEM — D22.5 MELANOCYTIC NEVI OF TRUNK: Status: ACTIVE | Noted: 2024-07-18

## 2024-07-18 PROCEDURE — ? SUNSCREEN RECOMMENDATIONS

## 2024-07-18 PROCEDURE — ? COUNSELING

## 2024-07-18 PROCEDURE — 17110 DESTRUCTION B9 LES UP TO 14: CPT

## 2024-07-18 PROCEDURE — ? BENIGN DESTRUCTION

## 2024-07-18 PROCEDURE — 99213 OFFICE O/P EST LOW 20 MIN: CPT | Mod: 25

## 2024-07-18 PROCEDURE — 17000 DESTRUCT PREMALG LESION: CPT | Mod: 59

## 2024-07-18 PROCEDURE — 17003 DESTRUCT PREMALG LES 2-14: CPT | Mod: 59

## 2024-07-18 PROCEDURE — ? LIQUID NITROGEN

## 2024-07-18 ASSESSMENT — LOCATION SIMPLE DESCRIPTION DERM
LOCATION SIMPLE: RIGHT EAR
LOCATION SIMPLE: LEFT EAR
LOCATION SIMPLE: LEFT FOREHEAD
LOCATION SIMPLE: LEFT CHEEK
LOCATION SIMPLE: LEFT FOREARM
LOCATION SIMPLE: RIGHT FOREARM
LOCATION SIMPLE: CHEST
LOCATION SIMPLE: RIGHT CHEEK
LOCATION SIMPLE: RIGHT FOREHEAD
LOCATION SIMPLE: RIGHT UPPER BACK

## 2024-07-18 ASSESSMENT — LOCATION DETAILED DESCRIPTION DERM
LOCATION DETAILED: RIGHT LATERAL MALAR CHEEK
LOCATION DETAILED: LEFT LATERAL FOREHEAD
LOCATION DETAILED: STERNUM
LOCATION DETAILED: LEFT SUPERIOR CENTRAL MALAR CHEEK
LOCATION DETAILED: LEFT SUPERIOR CRUS OF ANTIHELIX
LOCATION DETAILED: RIGHT SUPERIOR MEDIAL UPPER BACK
LOCATION DETAILED: RIGHT PROXIMAL DORSAL FOREARM
LOCATION DETAILED: RIGHT LATERAL SUPERIOR CHEST
LOCATION DETAILED: RIGHT LATERAL FOREHEAD
LOCATION DETAILED: RIGHT SUPERIOR HELIX
LOCATION DETAILED: LEFT LATERAL MALAR CHEEK
LOCATION DETAILED: LEFT PROXIMAL DORSAL FOREARM

## 2024-07-18 ASSESSMENT — LOCATION ZONE DERM
LOCATION ZONE: ARM
LOCATION ZONE: EAR
LOCATION ZONE: FACE
LOCATION ZONE: TRUNK

## 2024-07-18 ASSESSMENT — TOTAL NUMBER OF LESIONS: # OF LESIONS?: 5

## 2024-07-18 NOTE — PROCEDURE: BENIGN DESTRUCTION
Post-Care Instructions: I reviewed with the patient in detail post-care instructions. Patient is to wear sunprotection, and avoid picking at any of the treated lesions. Pt may apply Vaseline to crusted or scabbing areas.
Render Post-Care Instructions In Note?: no
Medical Necessity Information: It is in your best interest to select a reason for this procedure from the list below. All of these items fulfill various CMS LCD requirements except the new and changing color options.
Consent: The patient's consent was obtained including but not limited to risks of crusting, scabbing, blistering, scarring, darker or lighter pigmentary change, recurrence, incomplete removal and infection.
Anesthesia Volume In Cc: 0.5
Detail Level: Simple
Medical Necessity Clause: This procedure was medically necessary because the lesions that were treated were:
Treatment Number (Will Not Render If 0): 1